# Patient Record
Sex: FEMALE | Race: WHITE | NOT HISPANIC OR LATINO | Employment: FULL TIME
[De-identification: names, ages, dates, MRNs, and addresses within clinical notes are randomized per-mention and may not be internally consistent; named-entity substitution may affect disease eponyms.]

---

## 2017-05-26 ENCOUNTER — HEALTH MAINTENANCE LETTER (OUTPATIENT)
Age: 32
End: 2017-05-26

## 2017-07-29 ENCOUNTER — HOSPITAL ENCOUNTER (EMERGENCY)
Facility: CLINIC | Age: 32
Discharge: HOME | End: 2017-07-29
Attending: PHYSICIAN ASSISTANT | Admitting: PHYSICIAN ASSISTANT
Payer: COMMERCIAL

## 2017-07-29 VITALS
RESPIRATION RATE: 16 BRPM | BODY MASS INDEX: 37.12 KG/M2 | TEMPERATURE: 98.4 F | HEART RATE: 80 BPM | OXYGEN SATURATION: 99 % | DIASTOLIC BLOOD PRESSURE: 78 MMHG | WEIGHT: 230 LBS | SYSTOLIC BLOOD PRESSURE: 118 MMHG

## 2017-07-29 DIAGNOSIS — F11.93 OPIOID WITHDRAWAL (H): ICD-10-CM

## 2017-07-29 DIAGNOSIS — M54.5 CHRONIC BILATERAL LOW BACK PAIN, WITH SCIATICA PRESENCE UNSPECIFIED: ICD-10-CM

## 2017-07-29 DIAGNOSIS — R19.7 DIARRHEA, UNSPECIFIED TYPE: ICD-10-CM

## 2017-07-29 DIAGNOSIS — G89.29 CHRONIC BILATERAL LOW BACK PAIN, WITH SCIATICA PRESENCE UNSPECIFIED: ICD-10-CM

## 2017-07-29 DIAGNOSIS — R11.2 NAUSEA AND VOMITING, INTRACTABILITY OF VOMITING NOT SPECIFIED, UNSPECIFIED VOMITING TYPE: ICD-10-CM

## 2017-07-29 PROCEDURE — 25000132 ZZH RX MED GY IP 250 OP 250 PS 637: Performed by: PHYSICIAN ASSISTANT

## 2017-07-29 PROCEDURE — 99285 EMERGENCY DEPT VISIT HI MDM: CPT | Mod: Z6 | Performed by: PHYSICIAN ASSISTANT

## 2017-07-29 PROCEDURE — 99283 EMERGENCY DEPT VISIT LOW MDM: CPT | Performed by: PHYSICIAN ASSISTANT

## 2017-07-29 PROCEDURE — 25000125 ZZHC RX 250: Performed by: PHYSICIAN ASSISTANT

## 2017-07-29 RX ORDER — ONDANSETRON 4 MG/1
4 TABLET, ORALLY DISINTEGRATING ORAL ONCE
Status: COMPLETED | OUTPATIENT
Start: 2017-07-29 | End: 2017-07-29

## 2017-07-29 RX ORDER — LORAZEPAM 1 MG/1
1 TABLET ORAL 3 TIMES DAILY PRN
Status: DISCONTINUED | OUTPATIENT
Start: 2017-07-29 | End: 2017-07-29 | Stop reason: HOSPADM

## 2017-07-29 RX ORDER — ONDANSETRON 4 MG/1
4 TABLET, ORALLY DISINTEGRATING ORAL EVERY 8 HOURS PRN
Qty: 12 TABLET | Refills: 0 | Status: SHIPPED | OUTPATIENT
Start: 2017-07-29 | End: 2023-01-13

## 2017-07-29 RX ORDER — LORAZEPAM 1 MG/1
.5-1 TABLET ORAL 3 TIMES DAILY PRN
Qty: 8 TABLET | Refills: 0 | Status: SHIPPED | OUTPATIENT
Start: 2017-07-29 | End: 2023-01-13

## 2017-07-29 RX ORDER — LOPERAMIDE HCL 2 MG
4 CAPSULE ORAL 4 TIMES DAILY PRN
Status: DISCONTINUED | OUTPATIENT
Start: 2017-07-29 | End: 2017-07-29 | Stop reason: HOSPADM

## 2017-07-29 RX ADMIN — LOPERAMIDE HYDROCHLORIDE 4 MG: 2 CAPSULE ORAL at 13:41

## 2017-07-29 RX ADMIN — LORAZEPAM 1 MG: 1 TABLET ORAL at 13:43

## 2017-07-29 RX ADMIN — ONDANSETRON 4 MG: 4 TABLET, ORALLY DISINTEGRATING ORAL at 13:42

## 2017-07-29 ASSESSMENT — ENCOUNTER SYMPTOMS
DIARRHEA: 1
FEVER: 0
DIAPHORESIS: 1
NAUSEA: 1
MYALGIAS: 1
VOMITING: 0
APPETITE CHANGE: 1

## 2017-07-29 NOTE — ED AVS SNAPSHOT
[facility] Emergency Department    [address] [place] [address]    Phone:  [phone]    Fax:  [phone]                                       Valentina Miller   MRN: [id number]    Department:  [facility] Emergency Department   Date of Visit:  7/29/2017           Patient Information     Date Of Birth          1985        Your diagnoses for this visit were:     Opioid withdrawal (H)     Nausea and vomiting, intractability of vomiting not specified, unspecified vomiting type     Diarrhea, unspecified type     Chronic bilateral low back pain, with sciatica presence unspecified        You were seen by Sathish Delvalle PA-C.      Follow-up Information     Follow up with [facility] Emergency Department.    Specialty:  EMERGENCY MEDICINE    Why:  As needed, If symptoms worsen    Contact information:    [address] [place] [address] [phone]    Additional information:    From [address]: Exit at [address] on [place] side of [place]. Turn right on [address]. Turn left at stoplight on [address]. [facility] will be in view two blocks ahead        Discharge Instructions       1.  It is okay to take the Zofran every 8 hours as needed for nausea.  2.  Take the Loperamide ( Imodium over the counter) 4 mg every 6 hours as needed for diarrhea.  3.  Make sure that you are drinking enough water to stay hydrated.    4.  It is okay to use the Lorazepam as needed for continued nausea/vomiting and anxiety.  You should not drive or operate machinery for 8 hours after taking this medication as it can impair your judgement.  5.  See your Pain Clinic physician right away on Monday to figure out a solution to this issue.    24 Hour Appointment Hotline       To make an appointment at any [facility] call [phone] ([phone]). If you don't have a family doctor or clinic, we will help you find one. [facility] are conveniently  located to serve the needs of you and your family.             Review of your medicines      START taking        Dose / Directions Last dose taken    LORazepam 1 MG tablet   Commonly known as:  ATIVAN   Dose:  0.5-1 mg   Quantity:  8 tablet        Take 0.5-1 tablets (0.5-1 mg) by mouth 3 times daily as needed for anxiety, nausea or vomiting   Refills:  0        ondansetron 4 MG ODT tab   Commonly known as:  ZOFRAN-ODT   Dose:  4 mg   Quantity:  12 tablet        Take 1 tablet (4 mg) by mouth every 8 hours as needed for nausea   Refills:  0          Our records show that you are taking the medicines listed below. If these are incorrect, please call your family doctor or clinic.        Dose / Directions Last dose taken    albuterol 108 (90 BASE) MCG/ACT Inhaler   Commonly known as:  PROAIR HFA/PROVENTIL HFA/VENTOLIN HFA   Dose:  2 puff   Quantity:  1 Inhaler        Inhale 2 puffs into the lungs every 6 hours as needed for shortness of breath / dyspnea or wheezing   Refills:  0        busPIRone 15 MG tablet   Commonly known as:  BUSPAR   Dose:  15 mg        Take 15 mg by mouth 2 times daily   Refills:  0        CRYSELLE-28 PO        Refills:  0        escitalopram 20 MG tablet   Commonly known as:  LEXAPRO   Dose:  20 mg        Take 20 mg by mouth daily   Refills:  0        fluticasone 50 MCG/ACT spray   Commonly known as:  FLONASE   Dose:  1-2 spray   Quantity:  1 Bottle        Spray 1-2 sprays into both nostrils daily   Refills:  1        gabapentin 100 MG capsule   Commonly known as:  NEURONTIN   Dose:  100 mg        Take 100 mg by mouth 3 times daily.   Refills:  0        HYDROCHLOROTHIAZIDE PO        Refills:  0        hyoscyamine 0.125 MG sublingual tablet   Commonly known as:  LEVSIN/SL   Dose:  0.125 mg        Take 0.125 mg by mouth   Refills:  0        OMEPRAZOLE PO        Refills:  0        oxyCODONE-acetaminophen 5-325 MG per tablet   Commonly known as:  PERCOCET   Dose:  1 tablet        Take 1 tablet by  "mouth every 4 hours as needed.   Refills:  0        TOPROL XL PO        Refills:  0                Prescriptions were sent or printed at these locations (2 Prescriptions)                   [facility] [place] - [address]   [address] [place] [address]    Telephone:  [phone]   Fax:  [phone]   Hours:                  E-Prescribed (1 of 2)         ondansetron (ZOFRAN-ODT) 4 MG ODT tab                 Printed at Department/Unit printer (1 of 2)         LORazepam (ATIVAN) 1 MG tablet                Orders Needing Specimen Collection     None      Pending Results     No orders found from 7/27/2017 to 7/30/2017.            Pending Culture Results     No orders found from 7/27/2017 to 7/30/2017.            Pending Results Instructions     If you had any lab results that were not finalized at the time of your Discharge, you can call the ED Lab Result RN at [phone]. You will be contacted by this team for any positive Lab results or changes in treatment. The nurses are available 7 days a week from 10A to 6:30P.  You can leave a message 24 hours per day and they will return your call.        Thank you for choosing [facility]       Thank you for choosing [facility] for your care. Our goal is always to provide you with excellent care. Hearing back from our patients is one way we can continue to improve our services. Please take a few minutes to complete the written survey that you may receive in the mail after you visit with us. Thank you!        [employer] Information     [employer] lets you send messages to your doctor, view your test results, renew your prescriptions, schedule appointments and more. To sign up, go to [email or web address] . Click on \"Log in\" on the left side of the screen, which will take you to the Welcome page. Then click on \"Sign up Now\" on the right side of the page.     You will be asked to enter the access code listed below, as well as some personal " information. Please follow the directions to create your username and password.     Your access code is: [email or web address]  Expires: 10/27/2017  1:47 PM     Your access code will  in 90 days. If you need help or a new code, please call your [facility] clinic or [phone].        Care EveryWhere ID     This is your Care EveryWhere ID. This could be used by other organizations to access your [facility] medical records  [id number]        Equal Access to Services     [facility] : Haddilip marshallo Soirma, waaxda luqadaha, qaybta kaalmada adeelias, nolan henley . So [facility] [phone].    ATENCIÓN: Si habla español, tiene a estrada disposición servicios gratuitos de asistencia lingüística. Llame al [phone].    We comply with applicable federal civil rights laws and [place] laws. We do not discriminate on the basis of race, color, national origin, age, disability sex, sexual orientation or gender identity.            After Visit Summary       This is your record. Keep this with you and show to your community pharmacist(s) and doctor(s) at your next visit.

## 2017-07-29 NOTE — ED AVS SNAPSHOT
[facility] Emergency Department    [address] [place] [address]    Phone:  [phone]    Fax:  [phone]                                       Valentina Miller   MRN: [id number]    Department:  [facility] Emergency Department   Date of Visit:  7/29/2017           After Visit Summary Signature Page     I have received my discharge instructions, and my questions have been answered. I have discussed any challenges I see with this plan with the nurse or doctor.    ..........................................................................................................................................  Patient/Patient Representative Signature      ..........................................................................................................................................  Patient Representative Print Name and Relationship to Patient    ..................................................               ................................................  Date                                            Time    ..........................................................................................................................................  Reviewed by Signature/Title    ...................................................              ..............................................  Date                                                            Time

## 2017-07-29 NOTE — DISCHARGE INSTRUCTIONS
1.  It is okay to take the Zofran every 8 hours as needed for nausea.  2.  Take the Loperamide ( Imodium over the counter) 4 mg every 6 hours as needed for diarrhea.  3.  Make sure that you are drinking enough water to stay hydrated.    4.  It is okay to use the Lorazepam as needed for continued nausea/vomiting and anxiety.  You should not drive or operate machinery for 8 hours after taking this medication as it can impair your judgement.  5.  See your Pain Clinic physician right away on Monday to figure out a solution to this issue.
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
## 2017-07-31 ENCOUNTER — APPOINTMENT (OUTPATIENT)
Dept: GENERAL RADIOLOGY | Facility: CLINIC | Age: 32
End: 2017-07-31
Attending: FAMILY MEDICINE
Payer: COMMERCIAL

## 2017-07-31 ENCOUNTER — HOSPITAL ENCOUNTER (EMERGENCY)
Facility: CLINIC | Age: 32
Discharge: HOME | End: 2017-07-31
Attending: FAMILY MEDICINE | Admitting: FAMILY MEDICINE
Payer: COMMERCIAL

## 2017-07-31 VITALS
SYSTOLIC BLOOD PRESSURE: 128 MMHG | RESPIRATION RATE: 16 BRPM | DIASTOLIC BLOOD PRESSURE: 79 MMHG | TEMPERATURE: 98.9 F | OXYGEN SATURATION: 98 % | BODY MASS INDEX: 36.48 KG/M2 | HEART RATE: 69 BPM | WEIGHT: 226 LBS

## 2017-07-31 DIAGNOSIS — R07.89 ATYPICAL CHEST PAIN: ICD-10-CM

## 2017-07-31 LAB
ANION GAP SERPL CALCULATED.3IONS-SCNC: 7 MMOL/L (ref 3–14)
BUN SERPL-MCNC: 12 MG/DL (ref 7–30)
CALCIUM SERPL-MCNC: 8.6 MG/DL (ref 8.5–10.1)
CHLORIDE SERPL-SCNC: 109 MMOL/L (ref 94–109)
CO2 SERPL-SCNC: 25 MMOL/L (ref 20–32)
CREAT SERPL-MCNC: 0.7 MG/DL (ref 0.52–1.04)
GFR SERPL CREATININE-BSD FRML MDRD: ABNORMAL ML/MIN/1.7M2
GLUCOSE SERPL-MCNC: 83 MG/DL (ref 70–99)
POTASSIUM SERPL-SCNC: 3.2 MMOL/L (ref 3.4–5.3)
SODIUM SERPL-SCNC: 141 MMOL/L (ref 133–144)
TROPONIN I SERPL-MCNC: NORMAL UG/L (ref 0–0.04)

## 2017-07-31 PROCEDURE — 71010 XR CHEST PORT 1 VW: CPT | Mod: TC

## 2017-07-31 PROCEDURE — 84484 ASSAY OF TROPONIN QUANT: CPT | Performed by: FAMILY MEDICINE

## 2017-07-31 PROCEDURE — 93005 ELECTROCARDIOGRAM TRACING: CPT | Performed by: FAMILY MEDICINE

## 2017-07-31 PROCEDURE — 99285 EMERGENCY DEPT VISIT HI MDM: CPT | Mod: 25 | Performed by: FAMILY MEDICINE

## 2017-07-31 PROCEDURE — 80048 BASIC METABOLIC PNL TOTAL CA: CPT | Performed by: FAMILY MEDICINE

## 2017-07-31 PROCEDURE — 99284 EMERGENCY DEPT VISIT MOD MDM: CPT | Mod: Z6 | Performed by: FAMILY MEDICINE

## 2017-07-31 PROCEDURE — 93010 ELECTROCARDIOGRAM REPORT: CPT | Mod: Z6 | Performed by: FAMILY MEDICINE

## 2017-07-31 ASSESSMENT — ENCOUNTER SYMPTOMS
SHORTNESS OF BREATH: 1
NERVOUS/ANXIOUS: 1

## 2017-07-31 NOTE — ED NOTES
Pt has been out of pain medications since last Thursday. Seen in ED on Saturday and started on Ativan, zofran, and immodium. Continues with anxiety and is experiencing chest pain.

## 2017-07-31 NOTE — ED AVS SNAPSHOT
[facility] Department    [address] [place] [address]    Phone:  [phone]    Fax:  [phone]                                       Valetnina Miller   MRN: [id number]    Department:  [facility]   Date of Visit:  7/31/2017           Patient Information     Date Of Birth          1985        Your diagnoses for this visit were:     Atypical chest pain        You were seen by Hu Maier MD.      Follow-up Information     Schedule an appointment as soon as possible for a visit with Rosa M Garcia PA-C.    Specialty:  Family Practice    Why:  As needed    Contact information:    [facility]  [address] [place] [address] [phone]          Discharge Instructions         *CHEST PAIN, NONCARDIAC    Based on your visit today, the exact cause of your chest pain is not certain. Your condition does not seem serious and your pain does not appear to be coming from your heart. However, sometimes the signs of a serious problem take more time to appear. Therefore, please watch for the warning signs listed below.  HOME CARE:  1. Rest today and avoid strenuous activity.  2. Take any prescribed medicine as directed.    FOLLOW UP with your doctor in 1-3 days.     GET PROMPT MEDICAL ATTENTION if any of the following occur:    A change in the type of pain: if it feels different, becomes more severe, lasts longer, or begins to spread into your shoulder, arm, neck, jaw or back    Shortness of breath or increased pain with breathing    Cough with blood or dark colored sputum (phlegm)    Weakness, dizziness, or fainting    Fever over 101  F (38.3  C)    Swelling, pain or redness in one leg    Thank you for choosing our Emergency Department for your care.     Sincerely,    Dr Luke Maier M.D.      24 Hour Appointment Hotline       To make an appointment at any [facility], call [phone] ([phone]). If you don't have a  family doctor or clinic, we will help you find one. [facility] are conveniently located to serve the needs of you and your family.             Review of your medicines      Our records show that you are taking the medicines listed below. If these are incorrect, please call your family doctor or clinic.        Dose / Directions Last dose taken    albuterol 108 (90 BASE) MCG/ACT Inhaler   Commonly known as:  PROAIR HFA/PROVENTIL HFA/VENTOLIN HFA   Dose:  2 puff   Quantity:  1 Inhaler        Inhale 2 puffs into the lungs every 6 hours as needed for shortness of breath / dyspnea or wheezing   Refills:  0        busPIRone 15 MG tablet   Commonly known as:  BUSPAR   Dose:  15 mg        Take 15 mg by mouth 2 times daily   Refills:  0        CRYSELLE-28 PO        Refills:  0        escitalopram 20 MG tablet   Commonly known as:  LEXAPRO   Dose:  20 mg        Take 20 mg by mouth daily   Refills:  0        fluticasone 50 MCG/ACT spray   Commonly known as:  FLONASE   Dose:  1-2 spray   Quantity:  1 Bottle        Spray 1-2 sprays into both nostrils daily   Refills:  1        gabapentin 100 MG capsule   Commonly known as:  NEURONTIN   Dose:  100 mg        Take 100 mg by mouth 3 times daily.   Refills:  0        HYDROCHLOROTHIAZIDE PO        Refills:  0        hyoscyamine 0.125 MG sublingual tablet   Commonly known as:  LEVSIN/SL   Dose:  0.125 mg        Take 0.125 mg by mouth   Refills:  0        LORazepam 1 MG tablet   Commonly known as:  ATIVAN   Dose:  0.5-1 mg   Quantity:  8 tablet        Take 0.5-1 tablets (0.5-1 mg) by mouth 3 times daily as needed for anxiety, nausea or vomiting   Refills:  0        OMEPRAZOLE PO        Refills:  0        ondansetron 4 MG ODT tab   Commonly known as:  ZOFRAN-ODT   Dose:  4 mg   Quantity:  12 tablet        Take 1 tablet (4 mg) by mouth every 8 hours as needed for nausea   Refills:  0        oxyCODONE-acetaminophen 5-325 MG per tablet   Commonly known as:  PERCOCET   Dose:  1 tablet  "       Take 1 tablet by mouth every 4 hours as needed.   Refills:  0        TOPROL XL PO        Refills:  0                Procedures and tests performed during your visit     Basic metabolic panel    EKG 12-lead, tracing only    Troponin I    XR Chest Port 1 View      Orders Needing Specimen Collection     None      Pending Results     No orders found from 2017 to 2017.            Pending Culture Results     No orders found from 2017 to 2017.            Pending Results Instructions     If you had any lab results that were not finalized at the time of your Discharge, you can call the ED Lab Result RN at [phone]. You will be contacted by this team for any positive Lab results or changes in treatment. The nurses are available 7 days a week from 10A to 6:30P.  You can leave a message 24 hours per day and they will return your call.        Thank you for choosing [facility]       Thank you for choosing [facility] for your care. Our goal is always to provide you with excellent care. Hearing back from our patients is one way we can continue to improve our services. Please take a few minutes to complete the written survey that you may receive in the mail after you visit with us. Thank you!        [employer] Information     [employer] lets you send messages to your doctor, view your test results, renew your prescriptions, schedule appointments and more. To sign up, go to [email or web address] . Click on \"Log in\" on the left side of the screen, which will take you to the Welcome page. Then click on \"Sign up Now\" on the right side of the page.     You will be asked to enter the access code listed below, as well as some personal information. Please follow the directions to create your username and password.     Your access code is: [email or web address]  Expires: 10/27/2017  1:47 PM     Your access code will  in 90 days. If you need help or a new code, please call your [facility] clinic or [phone].        Care " EveryWhere ID     This is your Care EveryWhere ID. This could be used by other organizations to access your [facility] medical records  [id number]        Equal Access to Services     ELMER BROWN : Tiffanie Lara, nahed black, rula rollins, nolan thurman. So [facility] [phone].    ATENCIÓN: Si habla español, tiene a estrada disposición servicios gratuitos de asistencia lingüística. Llame al [phone].    We comply with applicable federal civil rights laws and [place] laws. We do not discriminate on the basis of race, color, national origin, age, disability sex, sexual orientation or gender identity.            After Visit Summary       This is your record. Keep this with you and show to your community pharmacist(s) and doctor(s) at your next visit.

## 2017-07-31 NOTE — ED AVS SNAPSHOT
[facility] Emergency Department    [address] [place] [address]    Phone:  [phone]    Fax:  [phone]                                       Valentina Miller   MRN: [id number]    Department:  [facility] Emergency Department   Date of Visit:  7/31/2017           After Visit Summary Signature Page     I have received my discharge instructions, and my questions have been answered. I have discussed any challenges I see with this plan with the nurse or doctor.    ..........................................................................................................................................  Patient/Patient Representative Signature      ..........................................................................................................................................  Patient Representative Print Name and Relationship to Patient    ..................................................               ................................................  Date                                            Time    ..........................................................................................................................................  Reviewed by Signature/Title    ...................................................              ..............................................  Date                                                            Time

## 2017-07-31 NOTE — DISCHARGE INSTRUCTIONS
*CHEST PAIN, NONCARDIAC    Based on your visit today, the exact cause of your chest pain is not certain. Your condition does not seem serious and your pain does not appear to be coming from your heart. However, sometimes the signs of a serious problem take more time to appear. Therefore, please watch for the warning signs listed below.  HOME CARE:  1. Rest today and avoid strenuous activity.  2. Take any prescribed medicine as directed.    FOLLOW UP with your doctor in 1-3 days.     GET PROMPT MEDICAL ATTENTION if any of the following occur:    A change in the type of pain: if it feels different, becomes more severe, lasts longer, or begins to spread into your shoulder, arm, neck, jaw or back    Shortness of breath or increased pain with breathing    Cough with blood or dark colored sputum (phlegm)    Weakness, dizziness, or fainting    Fever over 101  F (38.3  C)    Swelling, pain or redness in one leg    Thank you for choosing our Emergency Department for your care.     Sincerely,    Dr Luke Maier M.D.
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
## 2020-05-26 ENCOUNTER — HOSPITAL ENCOUNTER (EMERGENCY)
Facility: CLINIC | Age: 35
Discharge: HOME | End: 2020-05-26
Attending: EMERGENCY MEDICINE | Admitting: EMERGENCY MEDICINE
Payer: MEDICAID

## 2020-05-26 VITALS
SYSTOLIC BLOOD PRESSURE: 115 MMHG | OXYGEN SATURATION: 98 % | HEART RATE: 74 BPM | DIASTOLIC BLOOD PRESSURE: 78 MMHG | WEIGHT: 198 LBS | RESPIRATION RATE: 22 BRPM | BODY MASS INDEX: 31.96 KG/M2 | TEMPERATURE: 98.4 F

## 2020-05-26 DIAGNOSIS — F41.1 GENERALIZED ANXIETY DISORDER: ICD-10-CM

## 2020-05-26 PROCEDURE — 99283 EMERGENCY DEPT VISIT LOW MDM: CPT | Performed by: EMERGENCY MEDICINE

## 2020-05-26 PROCEDURE — 25000132 ZZH RX MED GY IP 250 OP 250 PS 637: Performed by: EMERGENCY MEDICINE

## 2020-05-26 PROCEDURE — 99283 EMERGENCY DEPT VISIT LOW MDM: CPT | Mod: Z6 | Performed by: EMERGENCY MEDICINE

## 2020-05-26 RX ORDER — ESCITALOPRAM OXALATE 20 MG/1
20 TABLET ORAL DAILY
Qty: 30 TABLET | Refills: 0 | Status: SHIPPED | OUTPATIENT
Start: 2020-05-26 | End: 2023-01-13 | Stop reason: ALTCHOICE

## 2020-05-26 RX ORDER — BUSPIRONE HYDROCHLORIDE 15 MG/1
15 TABLET ORAL 2 TIMES DAILY
Qty: 60 TABLET | Refills: 0 | Status: SHIPPED | OUTPATIENT
Start: 2020-05-26 | End: 2023-01-13 | Stop reason: SINTOL

## 2020-05-26 RX ORDER — LORAZEPAM 1 MG/1
1 TABLET ORAL ONCE
Status: COMPLETED | OUTPATIENT
Start: 2020-05-26 | End: 2020-05-26

## 2020-05-26 RX ADMIN — LORAZEPAM 1 MG: 1 TABLET ORAL at 07:14

## 2020-05-26 ASSESSMENT — ENCOUNTER SYMPTOMS
CONFUSION: 0
SLEEP DISTURBANCE: 1
NERVOUS/ANXIOUS: 1
HALLUCINATIONS: 0

## 2020-05-26 NOTE — ED PROVIDER NOTES
History     Chief Complaint   Patient presents with     Anxiety     HPI  Valentina Miller is a 35 year old female who arrives via EMS for evaluation of panic disorder.  Patient has a known history for metabolism disorder with current use of buspirone 50 mg twice daily and Lexapro 20 mg daily.  She is overwhelmed with her living situation.  Plans to move.  Difficult living with her father-in-law who she states is crazy now that the doctor stopped his pain medications.  The patient is overwhelmed with trying to go to work at [employer] and avoid exposure to the coronavirus.  She panics when she sees employees that do proper disinfecting.  Has noted hyperventilation shortness of breath.  Facial tingling.  Some stocking glove distribution for paresthesias well.     Allergies:  Allergies   Allergen Reactions     No Known Drug Allergies        Problem List:    Patient Active Problem List    Diagnosis Date Noted     Tobacco use disorder 10/26/2016     Priority: Medium     Family history of malignant neoplasm of ovary 02/21/2002     Priority: Medium        Past Medical History:    Past Medical History:   Diagnosis Date     Allergic rhinitis, cause unspecified      Depressive disorder, not elsewhere classified 2000     Unspecified symptom associated with female genital organs 2000       Past Surgical History:    Past Surgical History:   Procedure Laterality Date     HC LAPAROSCOPY, SURGICAL, ABDOMEN, PERITONEUM & OMENTUM; DX W/ OR W/O SPECIMEN(S)  5/2000       Family History:    Family History   Problem Relation Age of Onset     Cancer Mother         ovarian,skin     Depression Mother      Cancer Paternal Grandfather      Depression Brother         ADD     Neurologic Disorder Brother         chronic headaches     Allergies Sister        Social History:  Marital Status:  Single [1]  Social History     Tobacco Use     Smoking status: Current Every Day Smoker     Packs/day: 1.00     Smokeless tobacco: Never Used   Substance Use  Topics     Alcohol use: Yes     Comment: occasional     Drug use: Yes     Comment: octavia occasional        Medications:    albuterol (PROAIR HFA, PROVENTIL HFA, VENTOLIN HFA) 108 (90 BASE) MCG/ACT inhaler  busPIRone (BUSPAR) 15 MG tablet  escitalopram (LEXAPRO) 20 MG tablet  fluticasone (FLONASE) 50 MCG/ACT nasal spray  gabapentin (NEURONTIN) 100 MG capsule  HYDROCHLOROTHIAZIDE PO  hyoscyamine (LEVSIN/SL) 0.125 MG sublingual tablet  LORazepam (ATIVAN) 1 MG tablet  Metoprolol Succinate (TOPROL XL PO)  Norgestrel-Ethinyl Estradiol (CRYSELLE-28 PO)  OMEPRAZOLE PO  ondansetron (ZOFRAN-ODT) 4 MG ODT tab  oxycodone-acetaminophen (PERCOCET) 5-325 MG per tablet          Review of Systems   Psychiatric/Behavioral: Positive for sleep disturbance. Negative for confusion, hallucinations, self-injury and suicidal ideas. The patient is nervous/anxious.    All other systems reviewed and are negative.      Physical Exam   BP: 135/84  Pulse: 87  Heart Rate: 87  Temp: 98.4  F (36.9  C)  Resp: 22  Weight: 89.8 kg (198 lb)  SpO2: 98 %      Physical Exam  Vitals signs and nursing note reviewed.   Constitutional:       General: She is not in acute distress.     Appearance: She is not diaphoretic.   HENT:      Head: Atraumatic.      Nose: No rhinorrhea.      Mouth/Throat:      Pharynx: No oropharyngeal exudate.   Eyes:      General: No scleral icterus.     Conjunctiva/sclera: Conjunctivae normal.      Pupils: Pupils are equal, round, and reactive to light.   Cardiovascular:      Rate and Rhythm: Normal rate and regular rhythm.      Heart sounds: Normal heart sounds. No murmur.   Pulmonary:      Effort: No respiratory distress.      Breath sounds: Normal breath sounds. No wheezing or rhonchi.   Abdominal:      General: Bowel sounds are normal.      Palpations: Abdomen is soft.      Tenderness: There is no abdominal tenderness.   Musculoskeletal:         General: No tenderness.   Skin:     General: Skin is warm.      Capillary Refill:  Capillary refill takes less than 2 seconds.      Findings: No rash.   Neurological:      General: No focal deficit present.   Psychiatric:         Mood and Affect: Mood is anxious.         Speech: Speech normal.         Behavior: Behavior normal. Behavior is cooperative.         Thought Content: Thought content is not paranoid or delusional.         Cognition and Memory: Cognition normal.         ED Course        Procedures                   No results found for this or any previous visit (from the past 24 hour(s)).    Medications   LORazepam (ATIVAN) tablet 1 mg (has no administration in time range)       Assessments & Plan (with Medical Decision Making)  35 year old female  Generalized anxiety with acute panic attack at work. Overwhelmed with anxiety and  Stress and work concerns for Covid exposure. No thoughts of self harm.  7:57 AM  Feeling much better after lorazepam 1 mg p.o.  Encouraged patient to restart her Lexapro and her buspirone.  Those prescriptions were called into her pharmacy.  Work note provided for 1 week.     I have reviewed the nursing notes.    I have reviewed the findings, diagnosis, plan and need for follow up with the patient.      New Prescriptions    No medications on file       Final diagnoses:   Generalized anxiety disorder       5/26/2020   [facility] EMERGENCY DEPARTMENT     Luis Fernandez,   05/26/20 0806

## 2020-05-26 NOTE — ED NOTES
Patient reports that she is feeling better and would like to go as her mom is waiting in the parking lot. Patient appears more relaxed. Would like to see the doctor. Dr. Fernandez notified.

## 2020-05-26 NOTE — DISCHARGE INSTRUCTIONS
Work note provided to the away from work for the next week.  Please start both buspirone and Lexapro.  Would recommend scheduling appointment with your clinic provider in 2 weeks.

## 2020-05-26 NOTE — LETTER
May 26, 2020      To Whom It May Concern:      Valentina Miller was seen in our Emergency Department today, 05/26/20.  I expect her condition to improve over the next 7 days.  She may return to work when improved.    Sincerely,        Luis Fernandez Dr. [facility] ED Staff Physician

## 2020-05-26 NOTE — ED TRIAGE NOTES
Patient reports she feels as though she is having an anxiety attack, there is stress at home and she's currently moving. 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

## 2020-05-26 NOTE — ED AVS SNAPSHOT
[facility] Emergency Department  [address] [place] [address]  Phone:  [phone]  Fax:  [phone]                                    Valentina Miller   MRN: [id number]    Department:  [facility] Emergency Department   Date of Visit:  5/26/2020           After Visit Summary Signature Page    I have received my discharge instructions, and my questions have been answered. I have discussed any challenges I see with this plan with the nurse or doctor.    ..........................................................................................................................................  Patient/Patient Representative Signature      ..........................................................................................................................................  Patient Representative Print Name and Relationship to Patient    ..................................................               ................................................  Date                                   Time    ..........................................................................................................................................  Reviewed by Signature/Title    ...................................................              ..............................................  Date                                               Time          22EPIC Rev 08/18

## 2021-06-08 ENCOUNTER — HOSPITAL ENCOUNTER (EMERGENCY)
Facility: CLINIC | Age: 36
Discharge: HOME | End: 2021-06-08
Attending: EMERGENCY MEDICINE | Admitting: EMERGENCY MEDICINE
Payer: COMMERCIAL

## 2021-06-08 VITALS
OXYGEN SATURATION: 98 % | HEART RATE: 93 BPM | DIASTOLIC BLOOD PRESSURE: 83 MMHG | SYSTOLIC BLOOD PRESSURE: 134 MMHG | WEIGHT: 230 LBS | RESPIRATION RATE: 18 BRPM | TEMPERATURE: 98.2 F

## 2021-06-08 DIAGNOSIS — H72.91 PERFORATION OF TYMPANIC MEMBRANE, RIGHT: ICD-10-CM

## 2021-06-08 PROCEDURE — 99282 EMERGENCY DEPT VISIT SF MDM: CPT | Performed by: EMERGENCY MEDICINE

## 2021-06-08 PROCEDURE — 99283 EMERGENCY DEPT VISIT LOW MDM: CPT | Performed by: EMERGENCY MEDICINE

## 2021-06-08 NOTE — ED TRIAGE NOTES
Pt presents with concerns of bleeding from the right ear.  Pt states that last night after her shower she was cleaning her ear with a Q-tip when she started bleeding from the right ear.  Pt states that she is having difficulty hearing from the right ear.

## 2021-06-08 NOTE — Clinical Note
Valentina Miller was seen and treated in our emergency department on 6/8/2021.  She may return to work on 06/08/2021.       If you have any questions or concerns, please don't hesitate to call.      Jeni Verdugo, DO

## 2021-06-08 NOTE — ED PROVIDER NOTES
History     Chief Complaint   Patient presents with     Ear Problem     HPI  Valentina Miller is a 36 year old female who presents to the emergency room with right ear pain and bleeding.  She said she was cleaning her ears with a Q-tip after getting out of the shower yesterday and thinks she inserted the Q-tip too far.  She had pain and noted some bleeding.  In the morning she had dried blood around the outside of her ear and it seemed that it had continued through the night.  She has diminished hearing on the right side as well.    Allergies:  Allergies   Allergen Reactions     No Known Drug Allergies        Problem List:    Patient Active Problem List    Diagnosis Date Noted     Tobacco use disorder 10/26/2016     Priority: Medium     Family history of malignant neoplasm of ovary 02/21/2002     Priority: Medium        Past Medical History:    Past Medical History:   Diagnosis Date     Allergic rhinitis, cause unspecified      Depressive disorder, not elsewhere classified 2000     Unspecified symptom associated with female genital organs 2000       Past Surgical History:    Past Surgical History:   Procedure Laterality Date     HC LAPAROSCOPY, SURGICAL, ABDOMEN, PERITONEUM & OMENTUM; DX W/ OR W/O SPECIMEN(S)  5/2000       Family History:    Family History   Problem Relation Age of Onset     Cancer Mother         ovarian,skin     Depression Mother      Cancer Paternal Grandfather      Depression Brother         ADD     Neurologic Disorder Brother         chronic headaches     Allergies Sister        Social History:  Marital Status:  Single [1]  Social History     Tobacco Use     Smoking status: Current Every Day Smoker     Packs/day: 1.00     Smokeless tobacco: Never Used   Substance Use Topics     Alcohol use: Yes     Comment: occasional     Drug use: Yes     Comment: octavia occasional        Medications:    albuterol (PROAIR HFA, PROVENTIL HFA, VENTOLIN HFA) 108 (90 BASE) MCG/ACT inhaler  busPIRone (BUSPAR) 15 MG  tablet  busPIRone (BUSPAR) 15 MG tablet  escitalopram (LEXAPRO) 20 MG tablet  escitalopram (LEXAPRO) 20 MG tablet  fluticasone (FLONASE) 50 MCG/ACT nasal spray  gabapentin (NEURONTIN) 100 MG capsule  HYDROCHLOROTHIAZIDE PO  hyoscyamine (LEVSIN/SL) 0.125 MG sublingual tablet  LORazepam (ATIVAN) 1 MG tablet  Metoprolol Succinate (TOPROL XL PO)  Norgestrel-Ethinyl Estradiol (CRYSELLE-28 PO)  OMEPRAZOLE PO  ondansetron (ZOFRAN-ODT) 4 MG ODT tab  oxycodone-acetaminophen (PERCOCET) 5-325 MG per tablet          Review of Systems   All other systems reviewed and are negative.      Physical Exam   BP: 134/83  Pulse: 93  Temp: 98.2  F (36.8  C)  Resp: 18  Weight: 104.3 kg (230 lb)  SpO2: 98 %      Physical Exam  Vitals signs and nursing note reviewed.   Constitutional:       General: She is not in acute distress.     Appearance: She is obese.   HENT:      Head: Normocephalic and atraumatic.      Right Ear: Decreased hearing noted. Drainage present. Tympanic membrane is perforated.      Left Ear: Tympanic membrane normal.      Nose: Nose normal.   Eyes:      Extraocular Movements: Extraocular movements intact.      Pupils: Pupils are equal, round, and reactive to light.   Neck:      Musculoskeletal: Normal range of motion and neck supple.   Neurological:      Mental Status: She is alert.   Psychiatric:         Mood and Affect: Mood normal.         Behavior: Behavior normal.         ED Course        Procedures               Critical Care time:  none               No results found for this or any previous visit (from the past 24 hour(s)).    Medications - No data to display    Assessments & Plan (with Medical Decision Making)  Zita is a 36-year-old female who presents to the emergency room with bleeding from her right ear.  On exam found to have tympanic membrane perforation, likely due to her use of cotton swab.  Had to utilize suction to remove blood from the right ear canal, but did visualize the rupture.  Advised her to use  a molded wax or plug that could be used if she is trying to shower and keep water out.  I recommended she follow-up closely with her primary provider within 1 week to make sure that it is healing.  If she continues to have problems she may need to be referred to ENT.  Discussed signs and symptoms that should prompt return to the emergency room.  Also advised the patient against using any cotton swabs or Q-tips or inserting anything in her ears to clean them out in the future.  She is discharged in no acute distress     I have reviewed the nursing notes.    I have reviewed the findings, diagnosis, plan and need for follow up with the patient.       New Prescriptions    No medications on file       Final diagnoses:   Perforation of tympanic membrane, right       6/8/2021   [facility] EMERGENCY DEPT     Jeni Verdugo,   06/08/21 7634

## 2021-06-08 NOTE — DISCHARGE INSTRUCTIONS
You should wear earplugs to prevent water from getting into your ear    Do not introduce cotton swabs or anything else into your ear    You may still have some bleeding, decreased hearing, or experiencing dizziness.  This hopefully will improve as your eardrum heals    You should follow-up with your primary provider within 1 week so that they may recheck your eardrum and make sure it is healing.  They may refer you to an ear nose and throat specialist if it is not healing or if there are any other concerns    If you have any new or concerning symptoms, do not hesitate to return to the emergency room for evaluation

## 2021-07-17 ENCOUNTER — LAB REQUISITION (OUTPATIENT)
Dept: LAB | Facility: CLINIC | Age: 36
End: 2021-07-17
Payer: COMMERCIAL

## 2021-07-17 DIAGNOSIS — Z11.52 ENCOUNTER FOR SCREENING FOR COVID-19: ICD-10-CM

## 2021-07-19 LAB — SARS-COV-2 RNA RESP QL NAA+PROBE: NEGATIVE

## 2021-07-19 PROCEDURE — U0003 INFECTIOUS AGENT DETECTION BY NUCLEIC ACID (DNA OR RNA); SEVERE ACUTE RESPIRATORY SYNDROME CORONAVIRUS 2 (SARS-COV-2) (CORONAVIRUS DISEASE [COVID-19]), AMPLIFIED PROBE TECHNIQUE, MAKING USE OF HIGH THROUGHPUT TECHNOLOGIES AS DESCRIBED BY CMS-2020-01-R: HCPCS | Mod: ORL | Performed by: NURSE PRACTITIONER

## 2021-08-16 ENCOUNTER — LAB REQUISITION (OUTPATIENT)
Dept: LAB | Facility: CLINIC | Age: 36
End: 2021-08-16
Payer: COMMERCIAL

## 2021-08-16 DIAGNOSIS — Z11.52 ENCOUNTER FOR SCREENING FOR COVID-19: ICD-10-CM

## 2023-01-13 ENCOUNTER — HOSPITAL ENCOUNTER (EMERGENCY)
Facility: CLINIC | Age: 38
Discharge: HOME | End: 2023-01-13
Attending: EMERGENCY MEDICINE | Admitting: EMERGENCY MEDICINE
Payer: COMMERCIAL

## 2023-01-13 VITALS
TEMPERATURE: 99.1 F | OXYGEN SATURATION: 99 % | HEART RATE: 81 BPM | DIASTOLIC BLOOD PRESSURE: 78 MMHG | WEIGHT: 212.2 LBS | SYSTOLIC BLOOD PRESSURE: 116 MMHG | RESPIRATION RATE: 16 BRPM

## 2023-01-13 DIAGNOSIS — R19.7 DIARRHEA, UNSPECIFIED TYPE: ICD-10-CM

## 2023-01-13 LAB
ALBUMIN SERPL-MCNC: 3.4 G/DL (ref 3.4–5)
ALP SERPL-CCNC: 112 U/L (ref 40–150)
ALT SERPL W P-5'-P-CCNC: 48 U/L (ref 0–50)
ANION GAP SERPL CALCULATED.3IONS-SCNC: 5 MMOL/L (ref 3–14)
AST SERPL W P-5'-P-CCNC: 16 U/L (ref 0–45)
BASOPHILS # BLD AUTO: 0 10E3/UL (ref 0–0.2)
BASOPHILS NFR BLD AUTO: 1 %
BILIRUB SERPL-MCNC: 0.2 MG/DL (ref 0.2–1.3)
BUN SERPL-MCNC: 7 MG/DL (ref 7–30)
CALCIUM SERPL-MCNC: 9.4 MG/DL (ref 8.5–10.1)
CHLORIDE BLD-SCNC: 105 MMOL/L (ref 94–109)
CO2 SERPL-SCNC: 27 MMOL/L (ref 20–32)
CREAT SERPL-MCNC: 0.64 MG/DL (ref 0.52–1.04)
EOSINOPHIL # BLD AUTO: 0.1 10E3/UL (ref 0–0.7)
EOSINOPHIL NFR BLD AUTO: 1 %
ERYTHROCYTE [DISTWIDTH] IN BLOOD BY AUTOMATED COUNT: 13.5 % (ref 10–15)
GFR SERPL CREATININE-BSD FRML MDRD: >90 ML/MIN/1.73M2
GLUCOSE BLD-MCNC: 93 MG/DL (ref 70–99)
HCT VFR BLD AUTO: 36.1 % (ref 35–47)
HGB BLD-MCNC: 12 G/DL (ref 11.7–15.7)
IMM GRANULOCYTES # BLD: 0 10E3/UL
IMM GRANULOCYTES NFR BLD: 0 %
LIPASE SERPL-CCNC: 52 U/L (ref 73–393)
LYMPHOCYTES # BLD AUTO: 2.3 10E3/UL (ref 0.8–5.3)
LYMPHOCYTES NFR BLD AUTO: 32 %
MCH RBC QN AUTO: 31 PG (ref 26.5–33)
MCHC RBC AUTO-ENTMCNC: 33.2 G/DL (ref 31.5–36.5)
MCV RBC AUTO: 93 FL (ref 78–100)
MONOCYTES # BLD AUTO: 0.5 10E3/UL (ref 0–1.3)
MONOCYTES NFR BLD AUTO: 7 %
NEUTROPHILS # BLD AUTO: 4.4 10E3/UL (ref 1.6–8.3)
NEUTROPHILS NFR BLD AUTO: 59 %
NRBC # BLD AUTO: 0 10E3/UL
NRBC BLD AUTO-RTO: 0 /100
PLATELET # BLD AUTO: 203 10E3/UL (ref 150–450)
POTASSIUM BLD-SCNC: 3.9 MMOL/L (ref 3.4–5.3)
PROT SERPL-MCNC: 6.8 G/DL (ref 6.8–8.8)
RBC # BLD AUTO: 3.87 10E6/UL (ref 3.8–5.2)
SODIUM SERPL-SCNC: 137 MMOL/L (ref 133–144)
WBC # BLD AUTO: 7.4 10E3/UL (ref 4–11)

## 2023-01-13 PROCEDURE — 99283 EMERGENCY DEPT VISIT LOW MDM: CPT | Performed by: EMERGENCY MEDICINE

## 2023-01-13 PROCEDURE — 80053 COMPREHEN METABOLIC PANEL: CPT | Performed by: EMERGENCY MEDICINE

## 2023-01-13 PROCEDURE — 85025 COMPLETE CBC W/AUTO DIFF WBC: CPT | Performed by: EMERGENCY MEDICINE

## 2023-01-13 PROCEDURE — 96360 HYDRATION IV INFUSION INIT: CPT | Performed by: EMERGENCY MEDICINE

## 2023-01-13 PROCEDURE — 83690 ASSAY OF LIPASE: CPT | Performed by: EMERGENCY MEDICINE

## 2023-01-13 PROCEDURE — 36415 COLL VENOUS BLD VENIPUNCTURE: CPT | Performed by: EMERGENCY MEDICINE

## 2023-01-13 PROCEDURE — 258N000003 HC RX IP 258 OP 636: Performed by: EMERGENCY MEDICINE

## 2023-01-13 RX ORDER — TRAZODONE HYDROCHLORIDE 50 MG/1
100 TABLET, FILM COATED ORAL AT BEDTIME
COMMUNITY
Start: 2022-04-19

## 2023-01-13 RX ORDER — VENLAFAXINE HYDROCHLORIDE 75 MG/1
75 CAPSULE, EXTENDED RELEASE ORAL AT BEDTIME
COMMUNITY
Start: 2023-01-09 | End: 2024-01-09

## 2023-01-13 RX ORDER — LOPERAMIDE HYDROCHLORIDE AND SIMETHICONE 2; 125 MG/1; MG/1
4 TABLET ORAL DAILY PRN
COMMUNITY

## 2023-01-13 RX ORDER — DIPHENOXYLATE HCL/ATROPINE 2.5-.025MG
1 TABLET ORAL 4 TIMES DAILY PRN
COMMUNITY
Start: 2023-01-09

## 2023-01-13 RX ORDER — ALPRAZOLAM 1 MG
1 TABLET ORAL 2 TIMES DAILY PRN
COMMUNITY
Start: 2022-03-07

## 2023-01-13 RX ORDER — GABAPENTIN 300 MG/1
300 CAPSULE ORAL 3 TIMES DAILY
COMMUNITY
Start: 2022-04-14

## 2023-01-13 RX ORDER — FLUTICASONE PROPIONATE 50 MCG
2 SPRAY, SUSPENSION (ML) NASAL DAILY
COMMUNITY
Start: 2023-01-09 | End: 2023-01-13

## 2023-01-13 RX ORDER — ONDANSETRON 8 MG/1
8 TABLET, ORALLY DISINTEGRATING ORAL EVERY 8 HOURS PRN
COMMUNITY
Start: 2023-01-09 | End: 2023-01-13

## 2023-01-13 RX ORDER — HYDROXYZINE HYDROCHLORIDE 25 MG/1
50 TABLET, FILM COATED ORAL AT BEDTIME
COMMUNITY
Start: 2022-04-20

## 2023-01-13 RX ADMIN — SODIUM CHLORIDE 1000 ML: 9 INJECTION, SOLUTION INTRAVENOUS at 13:20

## 2023-01-13 ASSESSMENT — ACTIVITIES OF DAILY LIVING (ADL): ADLS_ACUITY_SCORE: 35

## 2023-01-13 NOTE — ED TRIAGE NOTES
She has a history of IBS and has had diarrhea for 2 weeks.  She had some black stools but at 0300 when she had her last loose stool it was not black.     Triage Assessment     Row Name 01/13/23 4822       Triage Assessment (Adult)    Airway WDL WDL       Respiratory WDL    Respiratory WDL WDL       Skin Circulation/Temperature WDL    Skin Circulation/Temperature WDL WDL       Cardiac WDL    Cardiac WDL WDL       Peripheral/Neurovascular WDL    Peripheral Neurovascular WDL WDL

## 2023-01-13 NOTE — ED PROVIDER NOTES
History     Chief Complaint   Patient presents with     Diarrhea     HPI  Valentina Miller is a 37 year old female with history of IBS, who presents with loose stools over the last 2 weeks, and over the last 1 to 2 days has had darker stools.  Patient was in contact with primary doctor and referred to come to the emergency department for evaluation of laboratory studies, enteric pathogen panel and hemoglobin to assess for anemia.  Patient denies nausea or vomiting, she has diffuse abdominal pain, denies any urinary symptoms.  Patient is able to continue to drink Gatorade and tolerated oral intake.  Denies abdominal surgical history other than cholecystectomy.    Allergies:  Allergies   Allergen Reactions     Amoxicillin-Pot Clavulanate Other (See Comments)     Tacrolimus Other (See Comments)     Flu like symptoms, eyes burning        Problem List:    Patient Active Problem List    Diagnosis Date Noted     Tobacco use disorder 10/26/2016     Priority: Medium     Family history of malignant neoplasm of ovary 02/21/2002     Priority: Medium        Past Medical History:    Past Medical History:   Diagnosis Date     Allergic rhinitis, cause unspecified      Depressive disorder, not elsewhere classified 2000     Unspecified symptom associated with female genital organs 2000       Past Surgical History:    Past Surgical History:   Procedure Laterality Date     HC LAPAROSCOPY, SURGICAL, ABDOMEN, PERITONEUM & OMENTUM; DX W/ OR W/O SPECIMEN(S)  5/2000       Family History:    Family History   Problem Relation Age of Onset     Cancer Mother         ovarian,skin     Depression Mother      Cancer Paternal Grandfather      Depression Brother         ADD     Neurologic Disorder Brother         chronic headaches     Allergies Sister        Social History:  Marital Status:  Single [1]  Social History     Tobacco Use     Smoking status: Every Day     Packs/day: 1.00     Types: Cigarettes     Smokeless tobacco: Never   Substance Use  Topics     Alcohol use: Yes     Comment: occasional     Drug use: Yes     Comment: octavia occasional        Medications:    albuterol (PROAIR HFA, PROVENTIL HFA, VENTOLIN HFA) 108 (90 BASE) MCG/ACT inhaler  busPIRone (BUSPAR) 15 MG tablet  busPIRone (BUSPAR) 15 MG tablet  escitalopram (LEXAPRO) 20 MG tablet  escitalopram (LEXAPRO) 20 MG tablet  fluticasone (FLONASE) 50 MCG/ACT nasal spray  gabapentin (NEURONTIN) 100 MG capsule  HYDROCHLOROTHIAZIDE PO  hyoscyamine (LEVSIN/SL) 0.125 MG sublingual tablet  LORazepam (ATIVAN) 1 MG tablet  Metoprolol Succinate (TOPROL XL PO)  Norgestrel-Ethinyl Estradiol (CRYSELLE-28 PO)  OMEPRAZOLE PO  ondansetron (ZOFRAN-ODT) 4 MG ODT tab  oxycodone-acetaminophen (PERCOCET) 5-325 MG per tablet          Review of Systems    Physical Exam   BP: 116/78  Pulse: 81  Temp: 99.1  F (37.3  C)  Resp: 18  Weight: 96.3 kg (212 lb 3.2 oz)  SpO2: 98 %      Physical Exam  Constitutional:       General: She is not in acute distress.     Appearance: She is not diaphoretic.   HENT:      Head: Atraumatic.      Mouth/Throat:      Mouth: Mucous membranes are moist.      Pharynx: No oropharyngeal exudate.   Eyes:      General: No scleral icterus.     Pupils: Pupils are equal, round, and reactive to light.   Pulmonary:      Effort: Pulmonary effort is normal.   Abdominal:      General: Bowel sounds are normal.      Palpations: Abdomen is soft.      Tenderness: There is abdominal tenderness (diffuse discomfort, not focally tender).   Musculoskeletal:         General: No tenderness.   Skin:     General: Skin is warm.      Findings: No rash.         ED Course        37-year-old female presenting with 2 weeks of loose stool, with dark stool over the last few days.  Differential diagnosis includes IBS, IBD, bacterial enteritis, viral enteritis, C. Difficile, GI bleed.  We will obtain laboratory studies to screen for any acute anemia, as well as obtain an enteric pathogen panel to assess because of loose  stool.  We will treat symptomatically with fluids.  Anticipate discharge home if reassuring studies.      ED Course as of 01/13/23 1543   Fri Jan 13, 2023   1324 Hemoglobin: 12.0  No indication for transfusion at this time   1324 WBC: 7.4     Patient unable to provide a stool sample therefore will discharge with stool collection kit, patient will return sample and follow-up with primary doctor regarding results.              No results found for this or any previous visit (from the past 24 hour(s)).    Medications   0.9% sodium chloride BOLUS (has no administration in time range)       Assessments & Plan (with Medical Decision Making)     I have reviewed the nursing notes.    I have reviewed the findings, diagnosis, plan and need for follow up with the patient.    Medical Decision Making  The patient presented with a problem that is a chronic illness severe exacerbation, progression, or side effect of treatment.    The patient's evaluation involved:  ordering and review of 3+ test(s) (CBC, CMP, lipase, enteric pathogen panel)  strong consideration of a test (CT abdomen pelvis) that was ultimately deferred    The patient's management involved only simple and very low risk treatment.        New Prescriptions    No medications on file       Final diagnoses:   None       1/13/2023   [facility] EMERGENCY DEPT     Ck Steele MD  01/13/23 1547